# Patient Record
Sex: FEMALE | Race: WHITE | Employment: FULL TIME | URBAN - METROPOLITAN AREA
[De-identification: names, ages, dates, MRNs, and addresses within clinical notes are randomized per-mention and may not be internally consistent; named-entity substitution may affect disease eponyms.]

---

## 2017-02-16 ENCOUNTER — TELEPHONE (OUTPATIENT)
Dept: OBGYN CLINIC | Facility: CLINIC | Age: 27
End: 2017-02-16

## 2017-02-16 NOTE — TELEPHONE ENCOUNTER
Pt has Nexplanon; annual appt scheduled for 2/20/17. Pt states she usually does not get menses at all. Pt reports menses starting 7 days ago; continues; changing pad q 3 hours; light brown color. Pt states flow seems to be getting \"thicker\".   Pt nigelie

## 2017-02-20 ENCOUNTER — OFFICE VISIT (OUTPATIENT)
Dept: OBGYN CLINIC | Facility: CLINIC | Age: 27
End: 2017-02-20

## 2017-02-20 VITALS
HEIGHT: 64.5 IN | BODY MASS INDEX: 38.22 KG/M2 | DIASTOLIC BLOOD PRESSURE: 60 MMHG | SYSTOLIC BLOOD PRESSURE: 122 MMHG | WEIGHT: 226.63 LBS | HEART RATE: 78 BPM

## 2017-02-20 DIAGNOSIS — Z01.419 ENCOUNTER FOR GYNECOLOGICAL EXAMINATION WITHOUT ABNORMAL FINDING: Primary | ICD-10-CM

## 2017-02-20 DIAGNOSIS — Z11.3 SCREENING FOR STD (SEXUALLY TRANSMITTED DISEASE): ICD-10-CM

## 2017-02-20 DIAGNOSIS — Z12.39 BREAST CANCER SCREENING: ICD-10-CM

## 2017-02-20 DIAGNOSIS — Z12.4 CERVICAL CANCER SCREENING: ICD-10-CM

## 2017-02-20 DIAGNOSIS — N92.6 IRREGULAR PERIODS: ICD-10-CM

## 2017-02-20 LAB — CONTROL LINE PRESENT WITH A CLEAR BACKGROUND (YES/NO): YES YES/NO

## 2017-02-20 PROCEDURE — 87491 CHLMYD TRACH DNA AMP PROBE: CPT | Performed by: OBSTETRICS & GYNECOLOGY

## 2017-02-20 PROCEDURE — 99395 PREV VISIT EST AGE 18-39: CPT | Performed by: OBSTETRICS & GYNECOLOGY

## 2017-02-20 PROCEDURE — 81025 URINE PREGNANCY TEST: CPT | Performed by: OBSTETRICS & GYNECOLOGY

## 2017-02-20 PROCEDURE — 87591 N.GONORRHOEAE DNA AMP PROB: CPT | Performed by: OBSTETRICS & GYNECOLOGY

## 2017-02-20 NOTE — PROGRESS NOTES
GYN H&P     2017  4:53 PM    CC: Patient presents with:  Physical  Breast Lump: left breast  Other: has been bleeding for one wk/ heavy  Vaginal Problem: odor/discharge      HPI: patient is a 27year old  here for her annual gyne exam. Pt is wo Outpatient Prescriptions on File Prior to Visit:  Etonogestrel (Miguel Ángel Grow SC) Inject  into the skin.  Disp:  Rfl:    Albuterol Sulfate HFA (PROAIR HFA) 108 (90 BASE) MCG/ACT Inhalation Aero Soln Inhale 2 puffs into the lungs every 4 to 6 hours as needed for cough, shortness of breath and wheezing. Cardiovascular: Negative for chest pain, palpitations and leg swelling.    Gastrointestinal: Negative for nausea, vomiting, abdominal pain, diarrhea, blood in stool   Genitourinary: Negative for dysuria, hematuria screening  -normal breast exam. Start screening mammogram at age 45  3. STD screening  -GC chl collected  5.  Irregular menses  -pelvic US  -neg urine hcg in office      Elizabeth Tejeda MD

## 2017-02-21 ENCOUNTER — HOSPITAL ENCOUNTER (OUTPATIENT)
Dept: ULTRASOUND IMAGING | Age: 27
Discharge: HOME | End: 2017-02-21
Attending: OBSTETRICS & GYNECOLOGY
Payer: COMMERCIAL

## 2017-02-21 DIAGNOSIS — N92.6 IRREGULAR PERIODS: ICD-10-CM

## 2017-02-21 LAB
C TRACH DNA SPEC QL NAA+PROBE: NEGATIVE
N GONORRHOEA DNA SPEC QL NAA+PROBE: NEGATIVE

## 2017-02-21 PROCEDURE — 76830 TRANSVAGINAL US NON-OB: CPT

## 2017-02-21 PROCEDURE — 76856 US EXAM PELVIC COMPLETE: CPT

## 2017-02-22 ENCOUNTER — TELEPHONE (OUTPATIENT)
Dept: OBGYN CLINIC | Facility: CLINIC | Age: 27
End: 2017-02-22

## 2017-02-22 ENCOUNTER — HOSPITAL ENCOUNTER (EMERGENCY)
Age: 27
Discharge: HOME | End: 2017-02-22
Attending: EMERGENCY MEDICINE
Payer: COMMERCIAL

## 2017-02-22 VITALS
DIASTOLIC BLOOD PRESSURE: 58 MMHG | TEMPERATURE: 98 F | HEART RATE: 65 BPM | WEIGHT: 220 LBS | SYSTOLIC BLOOD PRESSURE: 107 MMHG | RESPIRATION RATE: 18 BRPM | BODY MASS INDEX: 36.65 KG/M2 | OXYGEN SATURATION: 97 % | HEIGHT: 65 IN

## 2017-02-22 DIAGNOSIS — N83.202 LEFT OVARIAN CYST: Primary | ICD-10-CM

## 2017-02-22 DIAGNOSIS — R10.2 PELVIC PAIN: ICD-10-CM

## 2017-02-22 DIAGNOSIS — N83.202 CYST OF LEFT OVARY: Primary | ICD-10-CM

## 2017-02-22 LAB
ALBUMIN SERPL-MCNC: 3.9 G/DL (ref 3.5–4.8)
ALP LIVER SERPL-CCNC: 93 U/L (ref 37–98)
ALT SERPL-CCNC: 31 U/L (ref 14–54)
AST SERPL-CCNC: 21 U/L (ref 15–41)
BASOPHILS # BLD AUTO: 0.09 X10(3) UL (ref 0–0.1)
BASOPHILS NFR BLD AUTO: 1 %
BILIRUB SERPL-MCNC: 0.4 MG/DL (ref 0.1–2)
BILIRUB UR QL STRIP.AUTO: NEGATIVE
BUN BLD-MCNC: 7 MG/DL (ref 8–20)
CALCIUM BLD-MCNC: 8.4 MG/DL (ref 8.3–10.3)
CHLORIDE: 107 MMOL/L (ref 101–111)
CLARITY UR REFRACT.AUTO: CLEAR
CO2: 24 MMOL/L (ref 22–32)
COLOR UR AUTO: YELLOW
CREAT BLD-MCNC: 0.84 MG/DL (ref 0.55–1.02)
EOSINOPHIL # BLD AUTO: 0.49 X10(3) UL (ref 0–0.3)
EOSINOPHIL NFR BLD AUTO: 5.3 %
ERYTHROCYTE [DISTWIDTH] IN BLOOD BY AUTOMATED COUNT: 13.2 % (ref 11.5–16)
GLUCOSE BLD-MCNC: 95 MG/DL (ref 70–99)
GLUCOSE UR STRIP.AUTO-MCNC: NEGATIVE MG/DL
HCT VFR BLD AUTO: 45 % (ref 34–50)
HGB BLD-MCNC: 14.7 G/DL (ref 12–16)
IMMATURE GRANULOCYTE COUNT: 0.02 X10(3) UL (ref 0–1)
IMMATURE GRANULOCYTE RATIO %: 0.2 %
KETONES UR STRIP.AUTO-MCNC: NEGATIVE MG/DL
LYMPHOCYTES # BLD AUTO: 3.85 X10(3) UL (ref 0.9–4)
LYMPHOCYTES NFR BLD AUTO: 41.9 %
M PROTEIN MFR SERPL ELPH: 7.1 G/DL (ref 6.1–8.3)
MCH RBC QN AUTO: 29.2 PG (ref 27–33.2)
MCHC RBC AUTO-ENTMCNC: 32.7 G/DL (ref 31–37)
MCV RBC AUTO: 89.3 FL (ref 81–100)
MONOCYTES # BLD AUTO: 0.7 X10(3) UL (ref 0.1–0.6)
MONOCYTES NFR BLD AUTO: 7.6 %
NEUTROPHIL ABS PRELIM: 4.04 X10 (3) UL (ref 1.3–6.7)
NEUTROPHILS # BLD AUTO: 4.04 X10(3) UL (ref 1.3–6.7)
NEUTROPHILS NFR BLD AUTO: 44 %
NITRITE UR QL STRIP.AUTO: NEGATIVE
PH UR STRIP.AUTO: 7 [PH] (ref 4.5–8)
PLATELET # BLD AUTO: 312 10(3)UL (ref 150–450)
POCT LOT NUMBER: NORMAL
POCT URINE PREGNANCY: NEGATIVE
POTASSIUM SERPL-SCNC: 3.7 MMOL/L (ref 3.6–5.1)
PROT UR STRIP.AUTO-MCNC: NEGATIVE MG/DL
RBC # BLD AUTO: 5.04 X10(6)UL (ref 3.8–5.1)
RBC UR QL AUTO: NEGATIVE
RED CELL DISTRIBUTION WIDTH-SD: 43.1 FL (ref 35.1–46.3)
SODIUM SERPL-SCNC: 141 MMOL/L (ref 136–144)
SP GR UR STRIP.AUTO: 1.01 (ref 1–1.03)
UROBILINOGEN UR STRIP.AUTO-MCNC: 0.2 MG/DL
WBC # BLD AUTO: 9.2 X10(3) UL (ref 4–13)

## 2017-02-22 PROCEDURE — 99284 EMERGENCY DEPT VISIT MOD MDM: CPT

## 2017-02-22 PROCEDURE — 87086 URINE CULTURE/COLONY COUNT: CPT

## 2017-02-22 PROCEDURE — 80053 COMPREHEN METABOLIC PANEL: CPT | Performed by: EMERGENCY MEDICINE

## 2017-02-22 PROCEDURE — 96374 THER/PROPH/DIAG INJ IV PUSH: CPT

## 2017-02-22 PROCEDURE — 85025 COMPLETE CBC W/AUTO DIFF WBC: CPT | Performed by: EMERGENCY MEDICINE

## 2017-02-22 PROCEDURE — 81001 URINALYSIS AUTO W/SCOPE: CPT

## 2017-02-22 PROCEDURE — 81025 URINE PREGNANCY TEST: CPT

## 2017-02-22 RX ORDER — TRAMADOL HYDROCHLORIDE 50 MG/1
TABLET ORAL EVERY 4 HOURS PRN
Qty: 20 TABLET | Refills: 0 | Status: SHIPPED | OUTPATIENT
Start: 2017-02-22 | End: 2017-03-01

## 2017-02-22 RX ORDER — ONDANSETRON 2 MG/ML
4 INJECTION INTRAMUSCULAR; INTRAVENOUS ONCE
Status: COMPLETED | OUTPATIENT
Start: 2017-02-22 | End: 2017-02-22

## 2017-02-22 NOTE — TELEPHONE ENCOUNTER
Pt calling for ultrasound results. Pt c/o persistent LLQ pain that radiates around   To back. 8/10. Pt took ibuprofen 400mg without relief. Disc ibuprofen 600mg every 6 hours. Pt denies temp/chills/n/v/d. No dysuria or hematuria.    Pt has nexplanon pl

## 2017-02-22 NOTE — TELEPHONE ENCOUNTER
Pt notified. Verbalized understanding. Pt states some improvement with ibuprofen,   Will observe. If no improvement pt will   Call PCP for apt as left ovarian cyst is not   Likely the cause of pain. Pt to call if any questions or concerns.    U/s order

## 2017-02-22 NOTE — PROGRESS NOTES
Quick Note:    Informed patient of normal results. No further questions or concerns at this time.     ______

## 2017-02-22 NOTE — TELEPHONE ENCOUNTER
Called pt, no answer voicemail left regarding US. Small left ovarian cyst. Repeat in 3 months. Motrin for pain.

## 2017-02-23 NOTE — ED PROVIDER NOTES
Patient Seen in: THE [facility] Emergency Department In [place]    History   Patient presents with:  Abdomen/Flank Pain (GI/)    Stated Complaint: abdominal pain, onset 2/20. had US completed 2/21, has ovarian cyst    HPI    68-year-old female presents with puffs into the lungs every 4 to 6 hours as needed for Shortness of Breath. ClonazePAM (KLONOPIN) 0.5 MG Oral Tab,  2 (two) times daily as needed for Anxiety. FLUoxetine HCl (PROZAC) 20 MG Oral Cap,  Take 1 capsule by mouth daily.   Patient taking diff pallor. Oropharynx clear, mucous membranes moist   Neck: supple, no rigidity   Lungs: good air exchange and clear   Heart: regular rate rhythm and no murmur   Abdomen: Mild tenderness on palpation in the left lower pelvic region. No rebound or guarding. presents with irregular menstrual periods and left-sided pelvic pain. TECHNIQUE:  Pelvic ultrasound using transabdominal and endovaginal technique. Transvaginal ultrasound was used to better evaluate adnexal and endometrial detail.    FINDINGS: concerns.         Disposition and Plan     Clinical Impression:  Cyst of left ovary  (primary encounter diagnosis)    Disposition:  Discharge    Follow-up:  Hever Mohan MD  [place] [address]  [phone]      As

## 2017-03-08 NOTE — PROGRESS NOTES
Quick Note:    Patient informed of results. Verbalized understanding. No further questions or concerns.   ______

## 2017-05-15 PROBLEM — F32.9 MDD (MAJOR DEPRESSIVE DISORDER): Status: ACTIVE | Noted: 2017-05-15

## 2017-05-15 PROBLEM — F12.20 CANNABIS DEPENDENCE (HCC): Status: ACTIVE | Noted: 2017-05-15

## 2017-06-21 PROCEDURE — 89055 LEUKOCYTE ASSESSMENT FECAL: CPT | Performed by: INTERNAL MEDICINE

## 2017-06-21 PROCEDURE — 87177 OVA AND PARASITES SMEARS: CPT | Performed by: INTERNAL MEDICINE

## 2017-06-21 PROCEDURE — 81001 URINALYSIS AUTO W/SCOPE: CPT | Performed by: INTERNAL MEDICINE

## 2017-06-21 PROCEDURE — 87046 STOOL CULTR AEROBIC BACT EA: CPT | Performed by: INTERNAL MEDICINE

## 2017-06-21 PROCEDURE — 87045 FECES CULTURE AEROBIC BACT: CPT | Performed by: INTERNAL MEDICINE

## 2017-06-21 PROCEDURE — 87269 GIARDIA AG IF: CPT | Performed by: INTERNAL MEDICINE

## 2017-06-21 PROCEDURE — 87086 URINE CULTURE/COLONY COUNT: CPT | Performed by: INTERNAL MEDICINE

## 2017-06-21 PROCEDURE — 87209 SMEAR COMPLEX STAIN: CPT | Performed by: INTERNAL MEDICINE

## 2017-06-22 PROBLEM — F42.9 OCD (OBSESSIVE COMPULSIVE DISORDER): Status: ACTIVE | Noted: 2017-06-22

## 2017-06-22 PROBLEM — F41.9 ANXIETY: Status: ACTIVE | Noted: 2017-06-22

## (undated) NOTE — MR AVS SNAPSHOT
[facility] [address], [facility] [address] [facility]  [phone]               Thank you for choosing us for your health care visit with Jeffery Smith MD.  We are glad to serve you and happy to provide you with this Today's Orders     Chlamydia/Gc Amplification    Complete by:  Feb 20, 2017 (Approximate)    Assoc Dx:  Screening for STD (sexually transmitted disease) [Z11.3]           Urine Preg Test [71024]    Complete by:  As directed    Assoc Dx:   Irregular periods Tips for making healthy food choices  -   Enjoy your food, but eat less. Fully enjoy your food when eating. Don’t eat while distracted and slow down. Avoid over sized portions. Don’t eat while when you’re bored.      EAT THESE FOODS MORE OFTEN: EAT T

## (undated) NOTE — ED AVS SNAPSHOT
[facility] Emergency Department in [address] [facility]    Phone:  [phone]    Fax:  [phone]           Luigi Martinez   MRN: [id number]    Department:  [facility] Emergency Department in [place]   Date of IF THERE IS ANY CHANGE OR WORSENING OF YOUR CONDITION, CALL YOUR PRIMARY CARE PHYSICIAN AT ONCE OR RETURN IMMEDIATELY TO THE EMERGENCY DEPARTMENT.     If you have been prescribed any medication(s), please fill your prescription right away and begin taking t

## (undated) NOTE — ED AVS SNAPSHOT
THE [facility] Emergency Department in [address] [facility]    Phone:  [phone]    Fax:  [phone]           oLretta Romo   MRN: [id number]    Department:  THE [facility] Emergency Department in [place]   Date of Pediatric [phone] Emergency Department   [phone]       To Check ER Wait Times:  TEXT 'ERwait' to 61113      Click [email or web address]      Or call [phone]    If you have any problems with your follow-up, please call our case [facility] before you leave. After you leave, you should follow the attached instructions. I have read and understand the instructions given to me by my caregivers. 24-Hour Pharmacies        Pharmacy Address Phone Number   [phone]  [address] [employer] will allow you to access patient instructions from your recent visit,  view other health information, and more. To sign up or find more information, go to [email or web address] and click on the Sign Up Now link in the [employer] box.      Enter